# Patient Record
Sex: MALE | URBAN - METROPOLITAN AREA
[De-identification: names, ages, dates, MRNs, and addresses within clinical notes are randomized per-mention and may not be internally consistent; named-entity substitution may affect disease eponyms.]

---

## 2020-11-09 ENCOUNTER — OFFICE VISIT (OUTPATIENT)
Dept: URBAN - METROPOLITAN AREA CLINIC 37 | Facility: CLINIC | Age: 50
End: 2020-11-09

## 2020-11-09 VITALS — HEIGHT: 64 IN | WEIGHT: 164 LBS | BODY MASS INDEX: 28 KG/M2

## 2020-11-09 PROBLEM — 275978004 SCREENING FOR MALIGNANT NEOPLASM OF COLON: Status: ACTIVE | Noted: 2020-11-09

## 2020-11-09 RX ORDER — SODIUM SULFATE, POTASSIUM SULFATE, MAGNESIUM SULFATE 17.5; 3.13; 1.6 G/ML; G/ML; G/ML
AS DIRECTED SOLUTION, CONCENTRATE ORAL ONCE
Qty: 1 KIT | Refills: 0 | OUTPATIENT
Start: 2020-11-09

## 2020-11-09 RX ORDER — PANAX, AMERICAN GINSG/B12/ROYL 150-25-25
AS DIRECTED CAPSULE ORAL
Status: ACTIVE | COMMUNITY
Start: 2020-08-03

## 2020-11-09 NOTE — HPI-MIGRATED HPI
Colorectal cancer screening : Current bowel movement patterns -> One soft BM daily;   Colorectal cancer screening : Family history of GI malignancy: -> Denies;   Colorectal cancer screening : Patient is here for initial consultation for -> first time screening colonoscopy;   Colorectal cancer screening : Patients denies -> rectal bleeding, change in bowel habits, constipation, diarrhea, or abdominal pain;     Colorectal cancer screening : Denies dysphagia or odynophagia Currently taking anticoagulants/NSAIDs: No;

## 2020-11-11 ENCOUNTER — LAB OUTSIDE AN ENCOUNTER (OUTPATIENT)
Dept: URBAN - METROPOLITAN AREA CLINIC 37 | Facility: CLINIC | Age: 50
End: 2020-11-11

## 2020-12-14 ENCOUNTER — OFFICE VISIT (OUTPATIENT)
Dept: URBAN - METROPOLITAN AREA MEDICAL CENTER 10 | Facility: MEDICAL CENTER | Age: 50
End: 2020-12-14

## 2020-12-30 PROBLEM — 721704005: Status: ACTIVE | Noted: 2020-12-30

## 2020-12-30 PROBLEM — 68496003: Status: ACTIVE | Noted: 2020-12-30

## 2020-12-30 PROBLEM — 39772007: Status: ACTIVE | Noted: 2020-12-30

## 2021-01-06 ENCOUNTER — OFFICE VISIT (OUTPATIENT)
Dept: URBAN - METROPOLITAN AREA CLINIC 37 | Facility: CLINIC | Age: 51
End: 2021-01-06

## 2021-01-06 RX ORDER — PANAX, AMERICAN GINSG/B12/ROYL 150-25-25
AS DIRECTED CAPSULE ORAL
Status: ACTIVE | COMMUNITY
Start: 2020-08-03

## 2021-01-06 NOTE — HPI-MIGRATED HPI
Post-op OV : After Colonoscopy on -> 12/14/2020, at which time six small polyps were detected and resected. Histology showed they were hyperplastic polyps. Non-bleeding grade II internal and external hemorrhoids were found during retroflexion but not banded. Good quality bowel prep ;   Post-op OV : Patient denies -> rectal bleeding, fever, nausea & vomiting since the procedure date;   Post-op OV : Patient -> denies any new changes in his/her health status since last OV. Current BM: soft BM daily;